# Patient Record
Sex: FEMALE | Race: OTHER | NOT HISPANIC OR LATINO
[De-identification: names, ages, dates, MRNs, and addresses within clinical notes are randomized per-mention and may not be internally consistent; named-entity substitution may affect disease eponyms.]

---

## 2023-05-22 ENCOUNTER — RX ONLY (RX ONLY)
Age: 23
End: 2023-05-22

## 2023-05-22 ENCOUNTER — OFFICE (OUTPATIENT)
Dept: URBAN - METROPOLITAN AREA CLINIC 29 | Facility: CLINIC | Age: 23
Setting detail: OPHTHALMOLOGY
End: 2023-05-22
Payer: COMMERCIAL

## 2023-05-22 DIAGNOSIS — H52.13: ICD-10-CM

## 2023-05-22 DIAGNOSIS — H16.223: ICD-10-CM

## 2023-05-22 DIAGNOSIS — H40.013: ICD-10-CM

## 2023-05-22 PROCEDURE — 92004 COMPRE OPH EXAM NEW PT 1/>: CPT | Performed by: OPTOMETRIST

## 2023-05-22 PROCEDURE — 92133 CPTRZD OPH DX IMG PST SGM ON: CPT | Performed by: OPTOMETRIST

## 2023-05-22 PROCEDURE — 92015 DETERMINE REFRACTIVE STATE: CPT | Performed by: OPTOMETRIST

## 2023-05-22 ASSESSMENT — REFRACTION_AUTOREFRACTION
OD_AXIS: 118
OS_AXIS: 042
OS_SPHERE: -3.75
OS_CYLINDER: +0.75
OD_SPHERE: -3.75
OD_CYLINDER: +0.50

## 2023-05-22 ASSESSMENT — REFRACTION_CURRENTRX
OS_OVR_VA: 20/
OD_CYLINDER: +0.25
OD_OVR_VA: 20/
OD_AXIS: 126
OS_CYLINDER: +0.75
OS_SPHERE: -3.75
OD_SPHERE: -3.25
OS_AXIS: 071

## 2023-05-22 ASSESSMENT — CONFRONTATIONAL VISUAL FIELD TEST (CVF)
OS_FINDINGS: FULL
OD_FINDINGS: FULL

## 2023-05-22 ASSESSMENT — REFRACTION_MANIFEST
OS_AXIS: 040
OD_CYLINDER: +0.25
OD_SPHERE: -3.75
OD_AXIS: 120
OS_VA1: 20/20
OD_VA1: 20/20
OS_CYLINDER: +0.50
OS_SPHERE: -3.75

## 2023-05-22 ASSESSMENT — TEAR BREAK UP TIME (TBUT)
OS_TBUT: 1+
OD_TBUT: 1+

## 2023-05-22 ASSESSMENT — TONOMETRY
OS_IOP_MMHG: 15
OD_IOP_MMHG: 15

## 2023-05-22 ASSESSMENT — VISUAL ACUITY
OS_BCVA: 20/20-1
OD_BCVA: 20/20-1

## 2023-05-22 ASSESSMENT — SPHEQUIV_DERIVED
OD_SPHEQUIV: -3.5
OS_SPHEQUIV: -3.375
OS_SPHEQUIV: -3.5
OD_SPHEQUIV: -3.625

## 2024-07-13 ENCOUNTER — RESULT CHARGE (OUTPATIENT)
Age: 24
End: 2024-07-13

## 2024-07-13 ENCOUNTER — APPOINTMENT (OUTPATIENT)
Dept: ORTHOPEDIC SURGERY | Facility: CLINIC | Age: 24
End: 2024-07-13
Payer: COMMERCIAL

## 2024-07-13 DIAGNOSIS — S93.411A SPRAIN OF CALCANEOFIBULAR LIGAMENT OF RIGHT ANKLE, INITIAL ENCOUNTER: ICD-10-CM

## 2024-07-13 PROBLEM — Z00.00 ENCOUNTER FOR PREVENTIVE HEALTH EXAMINATION: Status: ACTIVE | Noted: 2024-07-13

## 2024-07-13 PROCEDURE — 73630 X-RAY EXAM OF FOOT: CPT | Mod: RT

## 2024-07-13 PROCEDURE — 73600 X-RAY EXAM OF ANKLE: CPT | Mod: RT

## 2024-07-13 PROCEDURE — 29515 APPLICATION SHORT LEG SPLINT: CPT | Mod: RT

## 2024-07-13 PROCEDURE — 99203 OFFICE O/P NEW LOW 30 MIN: CPT | Mod: 25

## 2024-07-17 ENCOUNTER — NON-APPOINTMENT (OUTPATIENT)
Age: 24
End: 2024-07-17

## 2024-07-22 ENCOUNTER — APPOINTMENT (OUTPATIENT)
Dept: ORTHOPEDIC SURGERY | Facility: CLINIC | Age: 24
End: 2024-07-22
Payer: COMMERCIAL

## 2024-07-22 VITALS — BODY MASS INDEX: 24.54 KG/M2 | WEIGHT: 125 LBS | HEIGHT: 60 IN

## 2024-07-22 DIAGNOSIS — S93.491A SPRAIN OF OTHER LIGAMENT OF RIGHT ANKLE, INITIAL ENCOUNTER: ICD-10-CM

## 2024-07-22 DIAGNOSIS — Z87.2 PERSONAL HISTORY OF DISEASES OF THE SKIN AND SUBCUTANEOUS TISSUE: ICD-10-CM

## 2024-07-22 DIAGNOSIS — Z78.9 OTHER SPECIFIED HEALTH STATUS: ICD-10-CM

## 2024-07-22 DIAGNOSIS — K31.84 GASTROPARESIS: ICD-10-CM

## 2024-07-22 PROCEDURE — 99203 OFFICE O/P NEW LOW 30 MIN: CPT

## 2024-07-22 RX ORDER — CETIRIZINE HCL 10 MG
TABLET ORAL
Refills: 0 | Status: ACTIVE | COMMUNITY

## 2024-07-22 RX ORDER — ERYTHROMYCIN 500 MG/1
TABLET, FILM COATED ORAL
Refills: 0 | Status: ACTIVE | COMMUNITY

## 2024-07-22 RX ORDER — DOCUSATE SODIUM 100 MG/1
CAPSULE ORAL
Refills: 0 | Status: ACTIVE | COMMUNITY

## 2024-07-22 RX ORDER — NORETHINDRONE ACETATE AND ETHINYL ESTRADIOL AND FERROUS FUMARATE 1MG-20(24)
KIT ORAL
Refills: 0 | Status: ACTIVE | COMMUNITY

## 2024-07-22 NOTE — HISTORY OF PRESENT ILLNESS
[Part time] : Work status: part time [de-identified] : 7/22/2024: fall 7/13 w/ ankle pain. went to [facility] walking in reg shoes. no prior sig ankle probs. denies dm/tob.  [] : Post Surgical Visit: no [FreeTextEntry1] : right foot/ankle [de-identified] : 7/13/24 [de-identified] : MALINDA Feliz [de-identified] : xrays  [de-identified] : none [de-identified] : [employer] worker

## 2024-07-22 NOTE — DATA REVIEWED
[Outside X-rays] : outside x-rays [Ankle] : ankle [Foot] : foot [I reviewed the films/CD and additionally noted] : I reviewed the films/CD and additionally noted [FreeTextEntry1] : no acute fx

## 2024-07-22 NOTE — PHYSICAL EXAM
[Right] : right foot and ankle [NL (40)] : plantar flexion 40 degrees [NL 30)] : inversion 30 degrees [NL (20)] : eversion 20 degrees [5___] : [facility] 5[unfilled]/5 [2+] : dorsalis pedis pulse: 2+ [Normal] : saphenous nerve sensation normal [] : patient ambulates without assistive device

## 2025-02-12 ENCOUNTER — OFFICE (OUTPATIENT)
Dept: URBAN - METROPOLITAN AREA CLINIC 35 | Facility: CLINIC | Age: 25
Setting detail: OPHTHALMOLOGY
End: 2025-02-12
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
## 2025-02-12 PROCEDURE — NO SHOW FE NO SHOW FEE: Performed by: STUDENT IN AN ORGANIZED HEALTH CARE EDUCATION/TRAINING PROGRAM
